# Patient Record
(demographics unavailable — no encounter records)

---

## 2025-05-30 NOTE — HISTORY OF PRESENT ILLNESS
[de-identified] : left hip fracture last year in [place] overall recovered well but still some weaknes s and pain occasionally in left hip

## 2025-05-30 NOTE — PHYSICAL EXAM
[de-identified] : left hip painless rom trendelenberg gait neuro intact skin intact [ain free straght leg raise abducor weakness [de-identified] : 3 views both hips left buck looks good, old troch fracture right hip mild oa